# Patient Record
Sex: MALE | Race: WHITE | NOT HISPANIC OR LATINO | URBAN - METROPOLITAN AREA
[De-identification: names, ages, dates, MRNs, and addresses within clinical notes are randomized per-mention and may not be internally consistent; named-entity substitution may affect disease eponyms.]

---

## 2017-01-04 ENCOUNTER — OFFICE VISIT - HEALTHEAST (OUTPATIENT)
Dept: INTERNAL MEDICINE | Facility: CLINIC | Age: 52
End: 2017-01-04

## 2017-01-04 DIAGNOSIS — R05.9 COUGH: ICD-10-CM

## 2017-01-04 ASSESSMENT — MIFFLIN-ST. JEOR: SCORE: 1655.46

## 2017-02-10 ENCOUNTER — OFFICE VISIT - HEALTHEAST (OUTPATIENT)
Dept: INTERNAL MEDICINE | Facility: CLINIC | Age: 52
End: 2017-02-10

## 2017-02-10 DIAGNOSIS — J45.901 ASTHMA EXACERBATION: ICD-10-CM

## 2017-02-10 DIAGNOSIS — R05.3 PERSISTENT COUGH FOR 3 WEEKS OR LONGER: ICD-10-CM

## 2017-02-10 ASSESSMENT — MIFFLIN-ST. JEOR: SCORE: 1669.07

## 2017-02-23 ENCOUNTER — OFFICE VISIT - HEALTHEAST (OUTPATIENT)
Dept: INTERNAL MEDICINE | Facility: CLINIC | Age: 52
End: 2017-02-23

## 2017-02-23 DIAGNOSIS — J45.909 ASTHMA: ICD-10-CM

## 2017-02-23 DIAGNOSIS — R05.3 PERSISTENT COUGH FOR 3 WEEKS OR LONGER: ICD-10-CM

## 2017-02-23 DIAGNOSIS — J45.901 ASTHMA EXACERBATION: ICD-10-CM

## 2017-02-23 RX ORDER — ALBUTEROL SULFATE 90 UG/1
2 AEROSOL, METERED RESPIRATORY (INHALATION) EVERY 4 HOURS PRN
Qty: 8 G | Refills: 0 | Status: SHIPPED | OUTPATIENT
Start: 2017-02-23

## 2017-02-23 ASSESSMENT — MIFFLIN-ST. JEOR: SCORE: 1669.07

## 2017-02-27 ENCOUNTER — COMMUNICATION - HEALTHEAST (OUTPATIENT)
Dept: FAMILY MEDICINE | Facility: CLINIC | Age: 52
End: 2017-02-27

## 2017-02-27 ENCOUNTER — AMBULATORY - HEALTHEAST (OUTPATIENT)
Dept: FAMILY MEDICINE | Facility: CLINIC | Age: 52
End: 2017-02-27

## 2017-02-27 DIAGNOSIS — R05.3 PERSISTENT COUGH FOR 3 WEEKS OR LONGER: ICD-10-CM

## 2017-03-03 ENCOUNTER — AMBULATORY - HEALTHEAST (OUTPATIENT)
Dept: PULMONOLOGY | Facility: OTHER | Age: 52
End: 2017-03-03

## 2017-03-03 DIAGNOSIS — R05.3 PERSISTENT COUGH: ICD-10-CM

## 2017-03-06 ENCOUNTER — COMMUNICATION - HEALTHEAST (OUTPATIENT)
Dept: SCHEDULING | Facility: CLINIC | Age: 52
End: 2017-03-06

## 2017-03-06 DIAGNOSIS — R05.3 PERSISTENT COUGH FOR 3 WEEKS OR LONGER: ICD-10-CM

## 2017-03-06 DIAGNOSIS — J45.909 ASTHMA: ICD-10-CM

## 2017-03-06 DIAGNOSIS — J45.901 ASTHMA EXACERBATION: ICD-10-CM

## 2017-03-27 ENCOUNTER — COMMUNICATION - HEALTHEAST (OUTPATIENT)
Dept: PULMONOLOGY | Facility: OTHER | Age: 52
End: 2017-03-27

## 2017-11-22 ENCOUNTER — COMMUNICATION - HEALTHEAST (OUTPATIENT)
Dept: NEUROLOGY | Facility: CLINIC | Age: 52
End: 2017-11-22

## 2018-01-19 ENCOUNTER — COMMUNICATION - HEALTHEAST (OUTPATIENT)
Dept: FAMILY MEDICINE | Facility: CLINIC | Age: 53
End: 2018-01-19

## 2018-01-19 DIAGNOSIS — R41.3 MEMORY IMPAIRMENT: ICD-10-CM

## 2018-04-16 ENCOUNTER — COMMUNICATION - HEALTHEAST (OUTPATIENT)
Dept: ADMINISTRATIVE | Facility: CLINIC | Age: 53
End: 2018-04-16

## 2021-05-30 VITALS — HEIGHT: 69 IN | BODY MASS INDEX: 27.55 KG/M2 | WEIGHT: 186 LBS

## 2021-05-30 VITALS — BODY MASS INDEX: 27.55 KG/M2 | WEIGHT: 186 LBS | HEIGHT: 69 IN

## 2021-05-30 VITALS — BODY MASS INDEX: 27.11 KG/M2 | WEIGHT: 183 LBS | HEIGHT: 69 IN

## 2021-06-08 NOTE — PROGRESS NOTES
[facility] Clinic Visit  Patient Name: Harvinder Noguera  Patient Age: 51 y.o.  YOB: 1965  MRN: [id number]  ?  Date of Visit: 1/4/2017  Reason for Office Visit:   Chief Complaint   Patient presents with     Cold     Since thanksgiving. Runny nose, cough. The cough gets worse with the cold. Had a fever in the beginning, but nothing current. Nasal congestion in the morning. Has been trying to cough up phlegm mainly in the morning.      Insomnia     Having issues with falling asleep at night. The last couple days. Is unsure if it is related to the cold.        HPI: Harvinder Noguera 51 y.o. male who presents to clinic for URI symptoms with cough going on now for over a month  Runny nose, cough, affecting sleep. All started after thanksgiving. Cough mostly at night when he sleeps. Sometimes activated bycold weather and when talking. He was given qvar inhaler, albuterol inhaler and Claritin. He has not used albuterol inhaler because it makes him feel jumpy.  Does use qvar daily which has not given him much relief. Has a persistent postnasal drip. Has been using Flonase nasal spray once a day in each nostril.   Has intermittent burning in chest when lying down a few times a week.   Not a smoker. No new medicaitons  Cough is mostly dry. No fevers/chills. No sob, cp, wheezing, chest tightness.       Review of Systems: As noted in HPI     Current Scheduled Meds:  Outpatient Encounter Prescriptions as of 1/4/2017   Medication Sig Dispense Refill     albuterol (PROVENTIL HFA;VENTOLIN HFA) 90 mcg/actuation inhaler Inhale 2 puffs every 6 (six) hours as needed for wheezing. 8 g 0     beclomethasone (QVAR) 40 mcg/actuation inhaler Inhale 2 puffs 2 (two) times a day. 1 Inhaler 12     cholecalciferol, vitamin D3, (VITAMIN D3) 2,000 unit Tab Take 2,000 Units by mouth daily.       fluticasone (FLONASE) 50 mcg/actuation nasal spray 1 spray into each nostril daily. 16 g 12     levothyroxine (SYNTHROID, LEVOTHROID) 100 MCG tablet TAKE  "ONE TABLET BY MOUTH ONE TIME DAILY 90 tablet 2     loratadine (CLARITIN) 10 mg tablet Take 1 tablet (10 mg total) by mouth daily. 30 tablet 0     triamcinolone (KENALOG) 0.5 % ointment Apply topically 2 (two) times a day. 30 g 1     benzonatate (TESSALON PERLES) 100 MG capsule Take 1 capsule (100 mg total) by mouth every 6 (six) hours as needed for cough. 30 capsule 0     montelukast (SINGULAIR) 10 mg tablet Take 1 tablet (10 mg total) by mouth bedtime for 30 doses. 30 tablet 0     omeprazole (PRILOSEC) 20 MG capsule Take 1 capsule (20 mg total) by mouth bedtime for 30 doses. 30 capsule 0     No facility-administered encounter medications on file as of 1/4/2017.      Past Medical History   Diagnosis Date     Hypothyroidism      Past Surgical History   Procedure Laterality Date     Tonsillectomy       Nasal septum surgery       Social History   Substance Use Topics     Smoking status: Never Smoker     Smokeless tobacco: Never Used     Alcohol use Yes      Comment: socially        Objective / Physical Examination:  Visit Vitals     /78     Pulse 63     Temp 97.3  F (36.3  C)     Resp 12     Ht 5' 9\" (1.753 m)     Wt 183 lb (83 kg)     SpO2 99%     BMI 27.02 kg/m2     Wt Readings from Last 3 Encounters:   01/04/17 183 lb (83 kg)   12/22/16 184 lb 1.6 oz (83.5 kg)   08/23/16 176 lb (79.8 kg)     Body mass index is 27.02 kg/(m^2). (>25?)    General Appearance: Alert and oriented in no acute distress  Eyes: Conjunctivae clear  Nose: mucosa moist and without drainage  Throat: Lips and mucosa moist. pharynx without erythema or exudate  Neck: Supple, trachea midline.  Lungs: Clear to auscultation bilaterally. Normal inspiratory and expiratory effort. No w/r/r  Cardiovascular: RRR S1, S2.  Integumentary: Warm and dry  Neuro: Alert and oriented, follows commands appropriately. Grossly normal.      Assessment / Plan / Medical Decision Making:      Encounter Diagnoses   Name Primary?     Cough Yes        1. " Cough    Persistent dry cough with paroxysmal spasms. May be multifactorial in the setting of lingering infection v postnasal drip v reflux. Will try adding Singulair to his daily regimen as he thinks allergies could be playing a part, as well as 20 mg omeprazole qhs and during the day he can try tessalon to see if this helps with spasms.     Lungs CTA, hemodynamically stable    - montelukast (SINGULAIR) 10 mg tablet; Take 1 tablet (10 mg total) by mouth bedtime for 30 doses.  Dispense: 30 tablet; Refill: 0  - omeprazole (PRILOSEC) 20 MG capsule; Take 1 capsule (20 mg total) by mouth bedtime for 30 doses.  Dispense: 30 capsule; Refill: 0  - benzonatate (TESSALON PERLES) 100 MG capsule; Take 1 capsule (100 mg total) by mouth every 6 (six) hours as needed for cough.  Dispense: 30 capsule; Refill: 0    Follow up if not improving, consider CXR or further diagnostic studies, PFTs etc?    Total time spent with patient was 15 minutes with >50% of time spent in face-to-face counseling regarding the above plan     Les Proctor MD  [facility]

## 2021-06-15 PROBLEM — R05.3 PERSISTENT COUGH FOR 3 WEEKS OR LONGER: Status: ACTIVE | Noted: 2017-03-03

## 2021-06-25 NOTE — PROGRESS NOTES
Progress Notes by Cem Julien at 2/23/2017 10:00 AM     Author: Cem Julien Service: -- Author Type: Nurse Practitioner    Filed: 2/23/2017 11:04 AM Encounter Date: 2/23/2017 Status: Signed    : Cem Julien [facility]    Date of Service: 2/23/2017  Primary Provider: Geraldine Lemos MD    Patient Care Team:  Gearldine Lemos MD as PCP - General (Family Medicine)     ______________________________________________________________________     Patient's Pharmacy:    [facility] [id number] [facility] - [place], [place] [facility] [address] [place] [address]  Phone: [phone] Fax: [phone]     Patient's Insurance:    Pay[facility] / Plan: [facility]/[employer] PPO / Product Type: PPO/POS/FFS /      ______________________________________________________________________     Harvinder Noguera is 51 y.o. male who comes in today for:  Chief Complaint   Patient presents with   ? Cough     Persistant cough follow up.       ______________________________________________________________________     Patient Active Problem List   Diagnosis   ? Hypothyroidism   ? Insomnia      Current Outpatient Prescriptions   Medication Sig   ? albuterol (PROVENTIL HFA;VENTOLIN HFA) 90 mcg/actuation inhaler Inhale 2 puffs every 4 (four) hours as needed for wheezing.   ? beclomethasone (QVAR) 40 mcg/actuation inhaler Inhale 2 puffs 2 (two) times a day.   ? cholecalciferol, vitamin D3, (VITAMIN D3) 2,000 unit Tab Take 2,000 Units by mouth daily.   ? fluticasone (FLONASE) 50 mcg/actuation nasal spray 1 spray into each nostril daily.   ? levothyroxine (SYNTHROID, LEVOTHROID) 100 MCG tablet TAKE ONE TABLET BY MOUTH ONE TIME DAILY   ? loratadine (CLARITIN) 10 mg tablet Take 1 tablet (10 mg total) by mouth daily.   ? triamcinolone (KENALOG) 0.5 % ointment Apply topically 2 (two) times a day.   ? codeine-guaiFENesin (GUAIFENESIN  "AC)  mg/5 mL liquid Take 5-10 mL by mouth every 4 (four) hours as needed for cough.   ? formoterol (FORADIL AEROLIZER) 12 mcg capsule for inhaler Place 1 capsule (12 mcg total) into inhaler and inhale 2 (two) times a day.      ______________________________________________________________________       History of present illness:  Harvinder Noguera is a 51 year-old male that presents to the clinic today for follow-up to his persisting cough.  He states that he saw benefit with the prednisone burst and his wife noted decreased episodes of coughing while he was on the steroid.  His cough is still a dry, spasmodic cough that seems to be triggered by cold air exposure, drinking cold drinks, and also increased or prolonged talking periods.  He also has some noted wheezing at night.  He has continued on his QVAR inhaler, albuterol inhaler TID with consistence.  He reports that with his new job he will be without insurance for 1 month and would like his medications refilled at this visit as to prevent abrupt discontinuance of them as they are cost prohibitive for him out of pocket.      On review of systems, the patient denies any fever, chills, sweats, chest pain or shortness of breath.  Positive for symptoms as noted in the HPI.  ______________________________________________________________________     Visit Vitals   ? /86   ? Pulse 74   ? Ht 5' 9\" (1.753 m)   ? Wt 186 lb (84.4 kg)   ? BMI 27.47 kg/m2       Exam:  Patient is comfortable, no apparent distress.  Mood good.  Insight good.  Ears - normal.  Nose exam shows no rhinitis.  Throat - normal.  Neck is supple, there is no cervical adenopathy.  No thyromegaly.  Heart regular rate and rhythm.  Lungs are clear to auscultation bilaterally.  Respiratory effort is good.     ______________________________________________________________________     Assessment:    1. Persistent cough for 3 weeks or longer    2. Asthma    3. Asthma exacerbation     "   ______________________________________________________________________    PHQ-2 Total Score: 0 (7/15/2016  8:00 AM)  No Data Recorded       Plan:  1. Try Foradil Inhaler 1 cap INH BID  2. Cough Syrup with codeine for increased cough at bedtime as needed  3. Continue QVAR and Albuterol as previously directed    Cem Julien, [facility]  Internal Medicine  [facility]    Return if symptoms worsen or fail to improve.

## 2021-06-25 NOTE — PROGRESS NOTES
Progress Notes by Cem Julien at 2/10/2017  9:20 AM     Author: Cem Julien Service: -- Author Type: Nurse Practitioner    Filed: 2/22/2017 10:35 PM Encounter Date: 2/10/2017 Status: Addendum    : Cem Julien    Related Notes: Original Note by Cem Julien filed at 2/22/2017 10:33 PM              [place] [facility]    Date of Service: 2/10/2017  Primary Provider: Geraldine Lemos MD    Patient Care Team:  Geraldine Lemos MD as PCP - General (Family Medicine)     ______________________________________________________________________     Patient's Pharmacy:    [facility] [id number] [facility] - [place], [place] - [address] [place] [address]  Phone: [phone] Fax: [phone]     Patient's Insurance:    Payor: [facility]/[employer] PPO / Plan: [facility]/[employer] PPO / Product Type: PPO/POS/FFS /      ______________________________________________________________________     Harvinder Noguera is 51 y.o. male who comes in today for:  Chief Complaint   Patient presents with   ? Cough     Persistant cough since after thanksgiving. Has been seen twice since then. When resting, he is fine. When talking or exposed to cold it gets worse. Not coughing up anything and no chest congestion. Feels like he has sob, but not always.       ______________________________________________________________________     Patient Active Problem List   Diagnosis   ? Hypothyroidism   ? Insomnia      Past Medical History:   Diagnosis Date   ? Hypothyroidism      Past Surgical History:   Procedure Laterality Date   ? NASAL SEPTUM SURGERY     ? TONSILLECTOMY       Social History     Social History   ? Marital status:      Spouse name: N/A   ? Number of children: N/A   ? Years of education: N/A     Occupational History   ? Not on file.     Social History Main Topics   ? Smoking status: Never Smoker   ? Smokeless tobacco: Never Used   ?  "Alcohol use Yes      Comment: socially    ? Drug use: No   ? Sexual activity: Yes     Partners: Female     Other Topics Concern   ? Not on file     Social History Narrative       Current Outpatient Prescriptions   Medication Sig   ? albuterol (PROVENTIL HFA;VENTOLIN HFA) 90 mcg/actuation inhaler Inhale 2 puffs every 4 (four) hours as needed for wheezing.   ? beclomethasone (QVAR) 40 mcg/actuation inhaler Inhale 2 puffs 2 (two) times a day.   ? cholecalciferol, vitamin D3, (VITAMIN D3) 2,000 unit Tab Take 2,000 Units by mouth daily.   ? fluticasone (FLONASE) 50 mcg/actuation nasal spray 1 spray into each nostril daily.   ? levothyroxine (SYNTHROID, LEVOTHROID) 100 MCG tablet TAKE ONE TABLET BY MOUTH ONE TIME DAILY   ? loratadine (CLARITIN) 10 mg tablet Take 1 tablet (10 mg total) by mouth daily.   ? triamcinolone (KENALOG) 0.5 % ointment Apply topically 2 (two) times a day.   ? benzonatate (TESSALON PERLES) 100 MG capsule Take 1 capsule (100 mg total) by mouth every 6 (six) hours as needed for cough.      ______________________________________________________________________       History of present illness:  Harvinder Noguera is a 51 year-old male with a past medical history that includes hypothyroidism and insomnia.  He presents in clinic today for persisting symptoms of cough and an \"itching sensation in my upper lungs and chest.\"  He feels his cough may be more related to postnasal drip symptoms now.  He has noted some occasional wheezes and the need to take frequent deep breaths.  Apparently, he has a remote history of asthma as a child stating that he recalls a couple of episodes of having to use inhalers, but was felt to have grown out of it as he was able to play hockey in high school without problems.  He was allergy tested and has previously undergone allergy shots as a child.  He stopped taking OTC allergy medications a few years ago because he did not experience any allergy symptoms.  His triggers include " "drinking cold liquids, exposure to cold air, and talking for longer periods of time.  He has tried Singulair, Prilosec with no benefit, however, he feels he may have had some benefit with benzonatate.  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      On review of systems, the patient denies fever, chills, night sweats, chest pain or shortness of breath.  Positive for frequent dry cough spasms, postnasal drip, itching sensation in upper lungs/chest, occasional wheezes, and frequent deep breaths.  ______________________________________________________________________     Visit Vitals   ? /80   ? Pulse 72   ? Resp 16   ? Ht 5' 9\" (1.753 m)   ? Wt 186 lb (84.4 kg)   ? SpO2 97%   ? BMI 27.47 kg/m2       Exam:  Patient is comfortable, no apparent distress.  Mood good.  Insight good.  Ears - Right ear canal mild erythema, Left ear canal normal; TMs normal bilaterally.  Nose exam shows no rhinitis.  Throat - swollen, erythematous, elongated uvula; oropharynx is normal, no exudate.  Neck is supple, there is no cervical adenopathy.  No thyromegaly.  Heart regular rate and rhythm.  Lungs are clear to auscultation bilaterally. Occasional spasms of dry cough.  Respiratory effort is good.     Imaging:  XR CHEST PA AND LATERAL 2/10/2017 10:22 AM  INDICATION: persistent cough x 2-3 months  COMPARISON: None.  FINDINGS: Negative chest.  This report was electronically interpreted by: Dr. Alfredo Murguia MD ON 02/10/2017 at 11:11    ______________________________________________________________________     Assessment:    1. Persistent cough for 3 weeks or longer    2. Asthma exacerbation       ______________________________________________________________________    PHQ-2 Total Score: 0 (7/15/2016  8:00 AM)  No Data Recorded       Plan:  1. Check Chest X-ray  2. Prednisone " 40 mg daily x 5 days, RF #1  3. Albuterol HFA 2 puffs q 4-6 hours as needed for wheezing, increased cough, shortness of breath  4. Continue QVAR 40 mcg INH 2 puffs BID       Cem Julien, [facility]  Internal Medicine  [facility]    Return if symptoms worsen or fail to improve.